# Patient Record
Sex: FEMALE | Race: BLACK OR AFRICAN AMERICAN | Employment: FULL TIME | URBAN - METROPOLITAN AREA
[De-identification: names, ages, dates, MRNs, and addresses within clinical notes are randomized per-mention and may not be internally consistent; named-entity substitution may affect disease eponyms.]

---

## 2017-12-15 PROBLEM — E66.09 OBESITY DUE TO EXCESS CALORIES: Status: ACTIVE | Noted: 2017-12-15

## 2018-12-19 PROBLEM — E66.01 OBESITY, MORBID (HCC): Status: ACTIVE | Noted: 2018-12-19

## 2020-03-26 ENCOUNTER — HOSPITAL ENCOUNTER (OUTPATIENT)
Dept: GENERAL RADIOLOGY | Age: 55
Discharge: HOME | End: 2020-03-26
Attending: INTERNAL MEDICINE
Payer: COMMERCIAL

## 2020-03-26 DIAGNOSIS — R05.9 COUGH: ICD-10-CM

## 2020-03-26 PROCEDURE — 71046 X-RAY EXAM CHEST 2 VIEWS: CPT

## 2020-05-21 PROBLEM — Z72.0 TOBACCO USE: Chronic | Status: ACTIVE | Noted: 2020-05-21

## 2020-05-21 PROBLEM — Z71.6 TOBACCO ABUSE COUNSELING: Status: ACTIVE | Noted: 2020-05-21

## 2022-03-18 PROBLEM — Z72.0 TOBACCO USE: Status: ACTIVE | Noted: 2020-05-21

## 2022-03-18 PROBLEM — Z71.6 TOBACCO ABUSE COUNSELING: Status: ACTIVE | Noted: 2020-05-21

## 2022-03-19 PROBLEM — E66.01 OBESITY, MORBID (HCC): Status: ACTIVE | Noted: 2018-12-19

## 2022-03-19 PROBLEM — E66.09 OBESITY DUE TO EXCESS CALORIES: Status: ACTIVE | Noted: 2017-12-15

## 2023-04-03 ENCOUNTER — TRANSCRIBE ORDER (OUTPATIENT)
Dept: GENERAL RADIOLOGY | Age: 58
End: 2023-04-03

## 2023-04-03 ENCOUNTER — HOSPITAL ENCOUNTER (OUTPATIENT)
Dept: GENERAL RADIOLOGY | Age: 58
End: 2023-04-03
Attending: INTERNAL MEDICINE
Payer: COMMERCIAL

## 2023-04-03 DIAGNOSIS — R06.09 DYSPNEA ON EXERTION: ICD-10-CM

## 2023-04-03 DIAGNOSIS — R06.09 DYSPNEA ON EXERTION: Primary | ICD-10-CM

## 2023-04-03 PROCEDURE — 71046 X-RAY EXAM CHEST 2 VIEWS: CPT

## 2023-05-24 RX ORDER — CLONIDINE HYDROCHLORIDE 0.1 MG/1
0.1 TABLET ORAL 2 TIMES DAILY
COMMUNITY
Start: 2023-03-02

## 2023-05-24 RX ORDER — METHYLPREDNISOLONE 4 MG/1
4 TABLET ORAL
COMMUNITY
Start: 2023-03-02

## 2023-05-24 RX ORDER — LOSARTAN POTASSIUM 100 MG/1
100 TABLET ORAL DAILY
COMMUNITY
Start: 2023-03-02

## 2023-05-24 RX ORDER — AMLODIPINE BESYLATE 10 MG/1
1 TABLET ORAL DAILY
COMMUNITY
Start: 2023-03-02

## 2023-05-24 RX ORDER — DOXAZOSIN 8 MG/1
TABLET ORAL
COMMUNITY
Start: 2023-03-02

## 2025-05-07 ENCOUNTER — TELEPHONE (OUTPATIENT)
Age: 60
End: 2025-05-07

## 2025-05-07 NOTE — TELEPHONE ENCOUNTER
Patient has been sent the link for our pre-recorded [facility] Medical Weight Loss Orientation. Patient is required to register, view the recording, call insurance to verify benefits, then send an email to the  to schedule a consultation.